# Patient Record
Sex: MALE | Race: WHITE | NOT HISPANIC OR LATINO | URBAN - METROPOLITAN AREA
[De-identification: names, ages, dates, MRNs, and addresses within clinical notes are randomized per-mention and may not be internally consistent; named-entity substitution may affect disease eponyms.]

---

## 2020-08-26 ENCOUNTER — OFFICE (AMBULATORY)
Dept: URBAN - METROPOLITAN AREA PATHOLOGY 4 | Facility: PATHOLOGY | Age: 60
End: 2020-08-26
Payer: COMMERCIAL

## 2020-08-26 ENCOUNTER — ON CAMPUS - OUTPATIENT (AMBULATORY)
Dept: URBAN - METROPOLITAN AREA HOSPITAL 2 | Facility: HOSPITAL | Age: 60
End: 2020-08-26

## 2020-08-26 VITALS
OXYGEN SATURATION: 97 % | HEART RATE: 68 BPM | DIASTOLIC BLOOD PRESSURE: 76 MMHG | HEART RATE: 84 BPM | OXYGEN SATURATION: 96 % | SYSTOLIC BLOOD PRESSURE: 154 MMHG | HEART RATE: 77 BPM | OXYGEN SATURATION: 99 % | SYSTOLIC BLOOD PRESSURE: 120 MMHG | OXYGEN SATURATION: 100 % | HEART RATE: 81 BPM | WEIGHT: 243 LBS | DIASTOLIC BLOOD PRESSURE: 100 MMHG | OXYGEN SATURATION: 94 % | DIASTOLIC BLOOD PRESSURE: 91 MMHG | RESPIRATION RATE: 18 BRPM | TEMPERATURE: 97.5 F | SYSTOLIC BLOOD PRESSURE: 144 MMHG | SYSTOLIC BLOOD PRESSURE: 124 MMHG | DIASTOLIC BLOOD PRESSURE: 73 MMHG | RESPIRATION RATE: 16 BRPM | DIASTOLIC BLOOD PRESSURE: 67 MMHG | HEART RATE: 79 BPM | DIASTOLIC BLOOD PRESSURE: 80 MMHG | HEART RATE: 78 BPM | SYSTOLIC BLOOD PRESSURE: 125 MMHG | HEART RATE: 88 BPM | HEART RATE: 67 BPM | OXYGEN SATURATION: 93 % | DIASTOLIC BLOOD PRESSURE: 72 MMHG | HEIGHT: 71 IN | SYSTOLIC BLOOD PRESSURE: 106 MMHG

## 2020-08-26 DIAGNOSIS — K21.0 GASTRO-ESOPHAGEAL REFLUX DISEASE WITH ESOPHAGITIS: ICD-10-CM

## 2020-08-26 DIAGNOSIS — D12.2 BENIGN NEOPLASM OF ASCENDING COLON: ICD-10-CM

## 2020-08-26 DIAGNOSIS — Z83.71 FAMILY HISTORY OF COLONIC POLYPS: ICD-10-CM

## 2020-08-26 DIAGNOSIS — K57.30 DIVERTICULOSIS OF LARGE INTESTINE WITHOUT PERFORATION OR ABS: ICD-10-CM

## 2020-08-26 DIAGNOSIS — K44.9 DIAPHRAGMATIC HERNIA WITHOUT OBSTRUCTION OR GANGRENE: ICD-10-CM

## 2020-08-26 DIAGNOSIS — K62.1 RECTAL POLYP: ICD-10-CM

## 2020-08-26 DIAGNOSIS — K64.1 SECOND DEGREE HEMORRHOIDS: ICD-10-CM

## 2020-08-26 DIAGNOSIS — K62.5 HEMORRHAGE OF ANUS AND RECTUM: ICD-10-CM

## 2020-08-26 PROBLEM — K63.5 POLYP OF COLON: Status: ACTIVE | Noted: 2020-08-26

## 2020-08-26 PROBLEM — K20.8 OTHER ESOPHAGITIS: Status: ACTIVE | Noted: 2020-08-26

## 2020-08-26 LAB
GI HISTOLOGY: A. UNSPECIFIED: (no result)
GI HISTOLOGY: B. UNSPECIFIED: (no result)
GI HISTOLOGY: C. UNSPECIFIED: (no result)
GI HISTOLOGY: PDF REPORT: (no result)

## 2020-08-26 PROCEDURE — 43239 EGD BIOPSY SINGLE/MULTIPLE: CPT | Performed by: INTERNAL MEDICINE

## 2020-08-26 PROCEDURE — 88305 TISSUE EXAM BY PATHOLOGIST: CPT | Performed by: INTERNAL MEDICINE

## 2020-08-26 PROCEDURE — 45380 COLONOSCOPY AND BIOPSY: CPT | Performed by: INTERNAL MEDICINE

## 2020-08-26 RX ORDER — ESOMEPRAZOLE MAGNESIUM 40 MG/1
40 CAPSULE, DELAYED RELEASE ORAL
Qty: 90 | Refills: 4 | Status: ACTIVE
Start: 2020-08-26

## 2024-11-14 ENCOUNTER — OFFICE VISIT (OUTPATIENT)
Dept: PODIATRY | Facility: CLINIC | Age: 64
End: 2024-11-14
Payer: COMMERCIAL

## 2024-11-14 VITALS — OXYGEN SATURATION: 96 % | HEART RATE: 60 BPM | BODY MASS INDEX: 35.42 KG/M2 | WEIGHT: 253 LBS | HEIGHT: 71 IN

## 2024-11-14 DIAGNOSIS — S92.101A: ICD-10-CM

## 2024-11-14 DIAGNOSIS — M25.571 ACUTE RIGHT ANKLE PAIN: Primary | ICD-10-CM

## 2024-11-14 DIAGNOSIS — S93.401A SEVERE ANKLE SPRAIN, RIGHT, INITIAL ENCOUNTER: ICD-10-CM

## 2024-11-14 RX ORDER — METHYLPREDNISOLONE 4 MG/1
TABLET ORAL
Qty: 21 TABLET | Refills: 0 | Status: SHIPPED | OUTPATIENT
Start: 2024-11-14

## 2024-11-14 RX ORDER — LISINOPRIL 40 MG/1
40 TABLET ORAL DAILY
COMMUNITY

## 2024-11-14 RX ORDER — METOPROLOL SUCCINATE 50 MG/1
50 TABLET, EXTENDED RELEASE ORAL DAILY
COMMUNITY
Start: 2024-10-25

## 2024-11-14 RX ORDER — HYDROCHLOROTHIAZIDE 25 MG/1
TABLET ORAL
COMMUNITY
Start: 2024-09-04

## 2024-11-14 RX ORDER — ROSUVASTATIN CALCIUM 5 MG/1
TABLET, COATED ORAL
COMMUNITY
Start: 2024-09-26

## 2024-11-14 RX ORDER — ESOMEPRAZOLE MAGNESIUM 40 MG/1
CAPSULE, DELAYED RELEASE ORAL
COMMUNITY
Start: 2024-09-04

## 2024-11-14 NOTE — PROGRESS NOTES
11/14/2024  Foot and Ankle Surgery - New Patient   Provider: Dr. Dionicio Haq DPM  Location: [facility]    Subjective:  Jacques Choi is a 64 y.o. male.     Chief Complaint   Patient presents with    Right Foot - Initial Evaluation     Small cortical fracture of the talar lateral process.  Ankle soft tissue swelling.  Tibiotalar and subtalar joint effusions.    Fell off ladder Saturday       Initial Evaluation     PCP- [facility], April 1/2023         History of Present Illness  The patient presents for evaluation of a right ankle injury. He is accompanied by his sister.    While cleaning gutters on Saturday afternoon, he placed the ladder on an unstable surface, causing it to slide sideways. He fell to the ground, landing on the ladder, which resulted in a twisted ankle. He has no prior history of similar injuries.    His ankle is slightly sprained and exhibits discoloration. He was informed that he has a broken talar dome. He has been moving his ankle slightly as prolonged immobility causes pain. He has a prescription for hydrocodone but is not currently taking it.    He is self-employed and his work involves standing, but he can adjust his tasks to be performed while sitting. He is considering using a knee scooter for mobility at work and home.       Allergies   Allergen Reactions    Penicillins Dermatitis    Sulfa Antibiotics Dermatitis       History reviewed. No pertinent past medical history.    History reviewed. No pertinent surgical history.    Family History   Problem Relation Age of Onset    Cancer Mother     Hypertension Mother     Heart disease Father     Hypertension Father     Cancer Sister     Diabetes Sister        Social History     Socioeconomic History    Marital status:    Tobacco Use    Smoking status: Never    Smokeless tobacco: Never   Vaping Use    Vaping status: Never Used   Substance and Sexual Activity    Alcohol use: Yes     Alcohol/week: 3.0  "standard drinks of alcohol     Types: 3 Cans of beer per week     Comment: social drinker-moderate    Drug use: Never    Sexual activity: Yes     Partners: Female        Current Outpatient Medications on File Prior to Visit   Medication Sig Dispense Refill    esomeprazole (nexIUM) 40 MG capsule       hydroCHLOROthiazide 25 MG tablet       lisinopril (PRINIVIL,ZESTRIL) 40 MG tablet Take 1 tablet by mouth Daily.      metoprolol succinate XL (TOPROL-XL) 50 MG 24 hr tablet Take 1 tablet by mouth Daily.      rosuvastatin (CRESTOR) 5 MG tablet       HYDROcodone-acetaminophen (NORCO) 5-325 MG per tablet Take 1 tablet by mouth Every 6 (Six) Hours As Needed for Moderate Pain or Severe Pain. (Patient not taking: Reported on 11/14/2024) 12 tablet 0     No current facility-administered medications on file prior to visit.         Objective   Pulse 60   Ht 180.3 cm (71\")   Wt 115 kg (253 lb)   SpO2 96%   BMI 35.29 kg/m²     Foot/Ankle Exam    GENERAL  Appearance:  appears stated age  Orientation:  AAOx3  Affect:  appropriate    VASCULAR     Right Foot Vascularity   Normal vascular exam    Dorsalis pedis:  2+  Posterior tibial:  2+  Skin temperature:  warm  Edema grading:  None  CFT:  < 3 seconds  Pedal hair growth:  Present  Varicosities:  none     NEUROLOGIC     Right Foot Neurologic   Light touch sensation: normal  Hot/Cold sensation: normal  Achilles reflex:  2+    MUSCULOSKELETAL     Right Foot Musculoskeletal   Ecchymosis:  ankle joint and lateral malleolus  Tenderness:  lateral malleolus tenderness    Arch:  Normal    DERMATOLOGIC      Right Foot Dermatologic   Skin  Right foot skin is intact.      Right foot additional comments: Range of motion, muscle strength, and instability testing limited secondary to guarding.  No proximal fibular pain.  No resting deformity.  No open wounds or signs of infection.    Physical Exam         Results  Imaging  Abnormal appearance to the lateral process of the ankle.       Assessment " & Plan   Diagnoses and all orders for this visit:    1. Acute right ankle pain (Primary)    2. Traumatic closed displaced fracture of talus, right, initial encounter    3. Severe ankle sprain, right, initial encounter    Other orders  -     methylPREDNISolone (MEDROL) 4 MG dose pack; Take as directed on package instructions.  Dispense: 21 tablet; Refill: 0       Assessment & Plan    The injury appears to be more related to soft tissue damage than bone. There is no evidence of a talar dome injury, but an abnormality is noted in the lateral process. The ankle joint remains intact, suggesting a soft tissue injury involving multiple structures. The healing process is expected to take 6 to 8 weeks. A boot was recommended for protection, stability, and to offload approximately 10 percent of his body weight. He was advised to use crutches and engage in partial weightbearing activity while in the boot. Rest, ice, compression, elevation, and anti-inflammatories were suggested for symptom management. The boot should be worn during activity and removed during rest or sleep. A compression garment was provided to aid in swelling reduction. Once swelling and discomfort subside, he should begin moving his feet up and down and in circles. A Medrol Dosepak was offered.Reviewed proper basic stretching and manual therapy exercises along with appropriate shoes and activity.  Discussed proper use and/or avoidance of OTC anti-inflammatories.  Patient is to call with any additional issues or concerns.  Greater than 45 minutes was spent before, during, and after evaluation for patient care.    Follow-up  Patient return in 3 weeks for follow up and repeat imaging of the ankle.           Patient or patient representative verbalized consent for the use of Ambient Listening during the visit with  MODESTO Haq DPM for chart documentation. 11/14/2024  16:30 EST    MODESTO Haq DPM

## 2024-11-15 ENCOUNTER — PATIENT ROUNDING (BHMG ONLY) (OUTPATIENT)
Dept: ORTHOPEDIC SURGERY | Facility: CLINIC | Age: 64
End: 2024-11-15
Payer: COMMERCIAL

## 2024-12-18 ENCOUNTER — OFFICE VISIT (OUTPATIENT)
Age: 64
End: 2024-12-18
Payer: COMMERCIAL

## 2024-12-18 VITALS — BODY MASS INDEX: 35.42 KG/M2 | HEIGHT: 71 IN | OXYGEN SATURATION: 95 % | WEIGHT: 253 LBS | HEART RATE: 63 BPM

## 2024-12-18 DIAGNOSIS — M25.571 ACUTE RIGHT ANKLE PAIN: Primary | ICD-10-CM

## 2024-12-18 DIAGNOSIS — S93.401A SEVERE ANKLE SPRAIN, RIGHT, INITIAL ENCOUNTER: ICD-10-CM

## 2024-12-18 DIAGNOSIS — S92.101D TRAUMATIC CLOSED DISPLACED FRACTURE OF TALUS WITH ROUTINE HEALING, RIGHT: ICD-10-CM

## 2024-12-18 RX ORDER — HYDRALAZINE HYDROCHLORIDE 50 MG/1
TABLET, FILM COATED ORAL
COMMUNITY
Start: 2024-12-11

## 2024-12-19 NOTE — PROGRESS NOTES
"12/18/2024  Foot and Ankle Surgery - Established Patient/Follow-up  Provider: Dr. Dionicio Haq DPM  Location: [facility]    Subjective:  Jacques Choi is a 64 y.o. male.     Chief Complaint   Patient presents with    Right Ankle - Follow-up     Right ankle fx- 3 week follow up    Follow-up     Lois Villanueva APRN- 4/01/2023       History of Present Illness  The patient presents for evaluation of a fracture.    He reports an improvement in his condition since the last visit, attributing this to the consistent use of a boot. He has been gradually increasing the weight-bearing capacity on the affected area, with minimal pain experienced during this process. He expresses concern about a potential bone fragment in the region just below his ankle. He has initiated some work activities and is able to drive without the boot. He has also begun to ambulate within his home environment. He declines the need for physical therapy at this time, believing that he is already engaging in necessary rehabilitative exercises.    Supplemental Information  He had another appointment 2 weeks ago for carcinoma.      Allergies   Allergen Reactions    Penicillins Dermatitis    Sulfa Antibiotics Dermatitis       Current Outpatient Medications on File Prior to Visit   Medication Sig Dispense Refill    esomeprazole (nexIUM) 40 MG capsule       hydrALAZINE (APRESOLINE) 50 MG tablet       hydroCHLOROthiazide 25 MG tablet       lisinopril (PRINIVIL,ZESTRIL) 40 MG tablet Take 1 tablet by mouth Daily.      methylPREDNISolone (MEDROL) 4 MG dose pack Take as directed on package instructions. 21 tablet 0    metoprolol succinate XL (TOPROL-XL) 50 MG 24 hr tablet Take 1 tablet by mouth Daily.      rosuvastatin (CRESTOR) 5 MG tablet        No current facility-administered medications on file prior to visit.       Objective   Pulse 63   Ht 180.3 cm (71\")   Wt 115 kg (253 lb)   SpO2 95%   BMI 35.29 kg/m²     Foot/Ankle Exam  Physical " Exam  GENERAL  Appearance:  appears stated age  Orientation:  AAOx3  Affect:  appropriate     VASCULAR      Right Foot Vascularity   Normal vascular exam    Dorsalis pedis:  2+  Posterior tibial:  2+  Skin temperature:  warm  Edema grading:  None  CFT:  < 3 seconds  Pedal hair growth:  Present  Varicosities:  none     NEUROLOGIC      Right Foot Neurologic   Light touch sensation: normal  Hot/Cold sensation: normal  Achilles reflex:  2+     MUSCULOSKELETAL      Right Foot Musculoskeletal   Ecchymosis:  ankle joint and lateral malleolus  Tenderness:  lateral malleolus tenderness    Arch:  Normal     DERMATOLOGIC       Right Foot Dermatologic   Skin  Right foot skin is intact.      Right foot additional comments: Range of motion, muscle strength, and instability testing limited secondary to guarding.  No proximal fibular pain.  No resting deformity.  No open wounds or signs of infection.    12/18/24: Less discomfort and swelling improved range of motion involving the right ankle.  No progressive deformity or instability      Results  Imaging  X-ray shows some consolidation of the fracture and changes in the soft tissue structures around the ankle.    Assessment & Plan   Diagnoses and all orders for this visit:    1. Acute right ankle pain (Primary)    2. Traumatic closed displaced fracture of talus with routine healing, right  -     XR Ankle 3+ View Right    3. Severe ankle sprain, right, initial encounter      Assessment & Plan    The fracture is showing signs of consolidation, indicating healing progress. Scar tissue and minor bone displacement are present, contributing to residual pain. He is advised to gradually transition away from using crutches and the boot over the next 1 to 2 weeks. A lace-up ankle brace will be provided for additional support during this transition. He is encouraged to engage in normal daily activities but should avoid aggressive movements and recreational walking. A follow-up x-ray will be  conducted in 6 weeks to monitor the healing process.Reviewed proper basic stretching and manual therapy exercises along with appropriate shoes and activity.  Discussed proper use and/or avoidance of OTC anti-inflammatories.  Patient is to call with any additional issues or concerns.  Greater than 20 minutes was spent before, during, and after evaluation for patient care.    Follow-up  The patient will follow up in 6 weeks and repeat imaging of the ankle.             Patient or patient representative verbalized consent for the use of Ambient Listening during the visit with  MODESTO Haq DPM for chart documentation. 12/19/2024  07:22 EST    MODESTO Haq DPM

## 2025-01-29 ENCOUNTER — OFFICE VISIT (OUTPATIENT)
Age: 65
End: 2025-01-29
Payer: COMMERCIAL

## 2025-01-29 VITALS — HEIGHT: 71 IN | BODY MASS INDEX: 35.42 KG/M2 | OXYGEN SATURATION: 95 % | WEIGHT: 253 LBS

## 2025-01-29 DIAGNOSIS — S92.101D TRAUMATIC CLOSED DISPLACED FRACTURE OF TALUS WITH ROUTINE HEALING, RIGHT: ICD-10-CM

## 2025-01-29 DIAGNOSIS — S93.401D SEVERE ANKLE SPRAIN, RIGHT, SUBSEQUENT ENCOUNTER: ICD-10-CM

## 2025-01-29 DIAGNOSIS — M25.571 ACUTE RIGHT ANKLE PAIN: Primary | ICD-10-CM

## 2025-01-30 NOTE — PROGRESS NOTES
"01/29/2025  Foot and Ankle Surgery - Established Patient/Follow-up  Provider: Dr. Dionicio Haq DPM  Location: [facility]    Subjective:  Jacques Choi is a 64 y.o. male.     Chief Complaint   Patient presents with    Right Ankle - Follow-up     Fracture, injury in November    Follow-up       [facility]           History of Present Illness  The patient presents for evaluation of right ankle pain.    He reports a significant improvement in his condition, with the ability to ambulate and ascend or descend stairs. However, he experiences persistent pain, particularly when he remains on his feet throughout the day. The pain, which is localized to the right anterior aspect of his foot, exhibits a migratory pattern, varying in location from day to day. He has not previously utilized over-the-counter arch supports. His daily activities include prolonged standing, lifting, and carrying, which he acknowledges may contribute to the stress on his foot. He has initiated some range of motion exercises and plans to incorporate swimming into his routine once his current workload decreases.    FAMILY HISTORY  His father had bone spurs on his heel.      Allergies   Allergen Reactions    Penicillins Dermatitis    Sulfa Antibiotics Dermatitis       Current Outpatient Medications on File Prior to Visit   Medication Sig Dispense Refill    esomeprazole (nexIUM) 40 MG capsule       hydroCHLOROthiazide 25 MG tablet       lisinopril (PRINIVIL,ZESTRIL) 40 MG tablet Take 1 tablet by mouth Daily.      methylPREDNISolone (MEDROL) 4 MG dose pack Take as directed on package instructions. 21 tablet 0    metoprolol succinate XL (TOPROL-XL) 50 MG 24 hr tablet Take 1 tablet by mouth Daily.      rosuvastatin (CRESTOR) 5 MG tablet        No current facility-administered medications on file prior to visit.       Objective   Ht 180.3 cm (71\")   Wt 115 kg (253 lb)   SpO2 95%   BMI 35.29 kg/m²     Foot/Ankle " Exam  Physical Exam  GENERAL  Appearance:  appears stated age  Orientation:  AAOx3  Affect:  appropriate     VASCULAR      Right Foot Vascularity   Normal vascular exam    Dorsalis pedis:  2+  Posterior tibial:  2+  Skin temperature:  warm  Edema grading:  None  CFT:  < 3 seconds  Pedal hair growth:  Present  Varicosities:  none     NEUROLOGIC      Right Foot Neurologic   Light touch sensation: normal  Hot/Cold sensation: normal  Achilles reflex:  2+     MUSCULOSKELETAL      Right Foot Musculoskeletal   Ecchymosis:  ankle joint and lateral malleolus  Tenderness:  lateral malleolus tenderness    Arch:  Normal     DERMATOLOGIC       Right Foot Dermatologic   Skin  Right foot skin is intact.      Right foot additional comments: Range of motion, muscle strength, and instability testing limited secondary to guarding.  No proximal fibular pain.  No resting deformity.  No open wounds or signs of infection.     12/18/24: Less discomfort and swelling improved range of motion involving the right ankle.  No progressive deformity or instability    1/29/25: Improvement noted to the ankle.  Less discomfort and swelling.  Range of motion has improved.  Continued discomfort with palpation involving the lateral aspect of the ankle.  No progressive deformity.  No proximal limb pain.      Results  Imaging  X-ray of the right ankle shows no abnormalities.    Assessment & Plan   Diagnoses and all orders for this visit:    1. Acute right ankle pain (Primary)    2. Traumatic closed displaced fracture of talus with routine healing, right  -     XR Ankle 3+ View Right    3. Severe ankle sprain, right, subsequent encounter      Assessment & Plan    The pain is attributed to soft tissue damage in the joint, which is expected to take an extended period for recovery. The symptoms are indicative of a functional issue rather than a bone-related problem, as confirmed by the x-ray results. An MRI was discussed but deemed unnecessary at this time as  it would not alter the current treatment plan. He was advised to gradually increase activity levels, focusing on low-impact exercises such as biking, elliptical, swimming, water aerobics, and gym-based exercises. The use of Powerstep inserts was recommended, to be worn daily for approximately 90 percent of the day, avoiding barefoot, flip-flops, sandals, and flats. He was also encouraged to continue with the knee exercises previously discussed. A referral for physical therapy was provided. A set of stretching exercises was given, with instructions to perform these 3 to 5 times daily. If there is any worsening of symptoms or the emergence of new issues, he is to contact the office immediately.Reviewed proper basic stretching and manual therapy exercises along with appropriate shoes and activity.  Discussed proper use and/or avoidance of OTC anti-inflammatories.  Patient is to call with any additional issues or concerns.  Greater than 20 minutes was spent before, during, and after evaluation for patient care.    Follow-up  The patient will follow up in 6 to 8 weeks.             Patient or patient representative verbalized consent for the use of Ambient Listening during the visit with  MODESTO Haq DPM for chart documentation. 1/30/2025  06:44 EST    MODESTO Haq DPM